# Patient Record
Sex: MALE | Race: BLACK OR AFRICAN AMERICAN | NOT HISPANIC OR LATINO | Employment: UNEMPLOYED | URBAN - METROPOLITAN AREA
[De-identification: names, ages, dates, MRNs, and addresses within clinical notes are randomized per-mention and may not be internally consistent; named-entity substitution may affect disease eponyms.]

---

## 2021-11-17 ENCOUNTER — OFFICE VISIT (OUTPATIENT)
Dept: URGENT CARE | Facility: CLINIC | Age: 31
End: 2021-11-17
Payer: MEDICAID

## 2021-11-17 VITALS
TEMPERATURE: 98 F | HEIGHT: 73 IN | WEIGHT: 110 LBS | SYSTOLIC BLOOD PRESSURE: 111 MMHG | OXYGEN SATURATION: 96 % | BODY MASS INDEX: 14.58 KG/M2 | DIASTOLIC BLOOD PRESSURE: 72 MMHG | HEART RATE: 76 BPM | RESPIRATION RATE: 18 BRPM

## 2021-11-17 DIAGNOSIS — R10.9 ABDOMINAL PAIN, UNSPECIFIED ABDOMINAL LOCATION: ICD-10-CM

## 2021-11-17 DIAGNOSIS — R05.9 COUGH: ICD-10-CM

## 2021-11-17 DIAGNOSIS — R11.2 NAUSEA AND VOMITING, INTRACTABILITY OF VOMITING NOT SPECIFIED, UNSPECIFIED VOMITING TYPE: Primary | ICD-10-CM

## 2021-11-17 LAB
BILIRUB UR QL STRIP: NEGATIVE
CTP QC/QA: YES
GLUCOSE UR QL STRIP: NEGATIVE
KETONES UR QL STRIP: POSITIVE
LEUKOCYTE ESTERASE UR QL STRIP: NEGATIVE
PH, POC UA: 7.5
POC BLOOD, URINE: NEGATIVE
POC NITRATES, URINE: NEGATIVE
PROT UR QL STRIP: POSITIVE
SARS-COV-2 RDRP RESP QL NAA+PROBE: NEGATIVE
SP GR UR STRIP: 1.01 (ref 1–1.03)
UROBILINOGEN UR STRIP-ACNC: ABNORMAL (ref 0.3–2.2)

## 2021-11-17 PROCEDURE — 99203 PR OFFICE/OUTPT VISIT, NEW, LEVL III, 30-44 MIN: ICD-10-PCS | Mod: 25,S$GLB,, | Performed by: NURSE PRACTITIONER

## 2021-11-17 PROCEDURE — U0002 COVID-19 LAB TEST NON-CDC: HCPCS | Mod: QW,S$GLB,, | Performed by: NURSE PRACTITIONER

## 2021-11-17 PROCEDURE — U0002: ICD-10-PCS | Mod: QW,S$GLB,, | Performed by: NURSE PRACTITIONER

## 2021-11-17 PROCEDURE — 99203 OFFICE O/P NEW LOW 30 MIN: CPT | Mod: 25,S$GLB,, | Performed by: NURSE PRACTITIONER

## 2021-11-17 PROCEDURE — 81003 POCT URINALYSIS, DIPSTICK, AUTOMATED, W/O SCOPE: ICD-10-PCS | Mod: QW,S$GLB,, | Performed by: NURSE PRACTITIONER

## 2021-11-17 PROCEDURE — 81003 URINALYSIS AUTO W/O SCOPE: CPT | Mod: QW,S$GLB,, | Performed by: NURSE PRACTITIONER

## 2021-11-17 RX ORDER — LEVOCETIRIZINE DIHYDROCHLORIDE 5 MG/1
5 TABLET, FILM COATED ORAL NIGHTLY
Qty: 30 TABLET | Refills: 11 | Status: SHIPPED | OUTPATIENT
Start: 2021-11-17 | End: 2023-09-07

## 2021-11-17 RX ORDER — ONDANSETRON 2 MG/ML
4 INJECTION INTRAMUSCULAR; INTRAVENOUS
Status: DISCONTINUED | OUTPATIENT
Start: 2021-11-17 | End: 2021-11-17

## 2021-11-17 RX ORDER — ONDANSETRON 2 MG/ML
4 INJECTION INTRAMUSCULAR; INTRAVENOUS
Status: COMPLETED | OUTPATIENT
Start: 2021-11-17 | End: 2021-11-17

## 2021-11-17 RX ORDER — ONDANSETRON 4 MG/1
4 TABLET, ORALLY DISINTEGRATING ORAL EVERY 6 HOURS PRN
Qty: 20 TABLET | Refills: 0 | OUTPATIENT
Start: 2021-11-17 | End: 2022-07-26

## 2021-11-17 RX ORDER — PROMETHAZINE HYDROCHLORIDE AND DEXTROMETHORPHAN HYDROBROMIDE 6.25; 15 MG/5ML; MG/5ML
5 SYRUP ORAL
Qty: 118 ML | Refills: 0 | OUTPATIENT
Start: 2021-11-17 | End: 2022-07-26

## 2021-11-17 RX ADMIN — ONDANSETRON 4 MG: 2 INJECTION INTRAMUSCULAR; INTRAVENOUS at 05:11

## 2021-11-18 ENCOUNTER — HOSPITAL ENCOUNTER (EMERGENCY)
Facility: HOSPITAL | Age: 31
Discharge: HOME | End: 2021-11-18
Attending: EMERGENCY MEDICINE
Payer: MEDICAID

## 2021-11-18 VITALS
BODY MASS INDEX: 15.17 KG/M2 | SYSTOLIC BLOOD PRESSURE: 100 MMHG | OXYGEN SATURATION: 99 % | TEMPERATURE: 97 F | RESPIRATION RATE: 16 BRPM | DIASTOLIC BLOOD PRESSURE: 63 MMHG | HEART RATE: 74 BPM | WEIGHT: 115 LBS

## 2021-11-18 DIAGNOSIS — B34.9 VIRAL SYNDROME: ICD-10-CM

## 2021-11-18 DIAGNOSIS — R11.2 NAUSEA AND VOMITING, INTRACTABILITY OF VOMITING NOT SPECIFIED, UNSPECIFIED VOMITING TYPE: Primary | ICD-10-CM

## 2021-11-18 LAB
ALBUMIN SERPL BCP-MCNC: 3.6 G/DL (ref 3.5–5.2)
ALP SERPL-CCNC: 62 U/L (ref 55–135)
ALT SERPL W/O P-5'-P-CCNC: 14 U/L (ref 10–44)
ANION GAP SERPL CALC-SCNC: 12 MMOL/L (ref 8–16)
AST SERPL-CCNC: 16 U/L (ref 10–40)
BASOPHILS # BLD AUTO: 0.01 K/UL (ref 0–0.2)
BASOPHILS NFR BLD: 0.1 % (ref 0–1.9)
BILIRUB SERPL-MCNC: 0.6 MG/DL (ref 0.1–1)
BUN SERPL-MCNC: 14 MG/DL (ref 6–20)
CALCIUM SERPL-MCNC: 9.4 MG/DL (ref 8.7–10.5)
CHLORIDE SERPL-SCNC: 101 MMOL/L (ref 95–110)
CO2 SERPL-SCNC: 25 MMOL/L (ref 23–29)
CREAT SERPL-MCNC: 0.9 MG/DL (ref 0.5–1.4)
DIFFERENTIAL METHOD: ABNORMAL
EOSINOPHIL # BLD AUTO: 0 K/UL (ref 0–0.5)
EOSINOPHIL NFR BLD: 0.4 % (ref 0–8)
ERYTHROCYTE [DISTWIDTH] IN BLOOD BY AUTOMATED COUNT: 12.3 % (ref 11.5–14.5)
EST. GFR  (AFRICAN AMERICAN): >60 ML/MIN/1.73 M^2
EST. GFR  (NON AFRICAN AMERICAN): >60 ML/MIN/1.73 M^2
GLUCOSE SERPL-MCNC: 105 MG/DL (ref 70–110)
HCT VFR BLD AUTO: 41.7 % (ref 40–54)
HGB BLD-MCNC: 14.3 G/DL (ref 14–18)
IMM GRANULOCYTES # BLD AUTO: 0.02 K/UL (ref 0–0.04)
IMM GRANULOCYTES NFR BLD AUTO: 0.3 % (ref 0–0.5)
LIPASE SERPL-CCNC: 25 U/L (ref 4–60)
LYMPHOCYTES # BLD AUTO: 0.8 K/UL (ref 1–4.8)
LYMPHOCYTES NFR BLD: 11.7 % (ref 18–48)
MCH RBC QN AUTO: 28.6 PG (ref 27–31)
MCHC RBC AUTO-ENTMCNC: 34.3 G/DL (ref 32–36)
MCV RBC AUTO: 83 FL (ref 82–98)
MONOCYTES # BLD AUTO: 0.6 K/UL (ref 0.3–1)
MONOCYTES NFR BLD: 8 % (ref 4–15)
NEUTROPHILS # BLD AUTO: 5.4 K/UL (ref 1.8–7.7)
NEUTROPHILS NFR BLD: 79.5 % (ref 38–73)
NRBC BLD-RTO: 0 /100 WBC
PLATELET # BLD AUTO: 288 K/UL (ref 150–450)
PMV BLD AUTO: 10.8 FL (ref 9.2–12.9)
POTASSIUM SERPL-SCNC: 3.7 MMOL/L (ref 3.5–5.1)
PROT SERPL-MCNC: 7.9 G/DL (ref 6–8.4)
RBC # BLD AUTO: 5 M/UL (ref 4.6–6.2)
SODIUM SERPL-SCNC: 138 MMOL/L (ref 136–145)
WBC # BLD AUTO: 6.85 K/UL (ref 3.9–12.7)

## 2021-11-18 PROCEDURE — 25000003 PHARM REV CODE 250: Performed by: PHYSICIAN ASSISTANT

## 2021-11-18 PROCEDURE — 83690 ASSAY OF LIPASE: CPT | Performed by: PHYSICIAN ASSISTANT

## 2021-11-18 PROCEDURE — 36415 COLL VENOUS BLD VENIPUNCTURE: CPT | Performed by: PHYSICIAN ASSISTANT

## 2021-11-18 PROCEDURE — 63600175 PHARM REV CODE 636 W HCPCS: Performed by: PHYSICIAN ASSISTANT

## 2021-11-18 PROCEDURE — 80053 COMPREHEN METABOLIC PANEL: CPT | Performed by: PHYSICIAN ASSISTANT

## 2021-11-18 PROCEDURE — 96361 HYDRATE IV INFUSION ADD-ON: CPT

## 2021-11-18 PROCEDURE — 96374 THER/PROPH/DIAG INJ IV PUSH: CPT

## 2021-11-18 PROCEDURE — 85025 COMPLETE CBC W/AUTO DIFF WBC: CPT | Performed by: PHYSICIAN ASSISTANT

## 2021-11-18 PROCEDURE — 99284 EMERGENCY DEPT VISIT MOD MDM: CPT | Mod: 25

## 2021-11-18 RX ORDER — ONDANSETRON 2 MG/ML
4 INJECTION INTRAMUSCULAR; INTRAVENOUS
Status: COMPLETED | OUTPATIENT
Start: 2021-11-18 | End: 2021-11-18

## 2021-11-18 RX ADMIN — ONDANSETRON 4 MG: 2 INJECTION INTRAMUSCULAR; INTRAVENOUS at 06:11

## 2021-11-18 RX ADMIN — SODIUM CHLORIDE 1000 ML: 0.9 INJECTION, SOLUTION INTRAVENOUS at 06:11

## 2021-12-22 ENCOUNTER — HOSPITAL ENCOUNTER (EMERGENCY)
Facility: HOSPITAL | Age: 31
Discharge: HOME | End: 2021-12-22
Attending: EMERGENCY MEDICINE
Payer: MEDICAID

## 2021-12-22 ENCOUNTER — PATIENT OUTREACH (OUTPATIENT)
Dept: EMERGENCY MEDICINE | Facility: HOSPITAL | Age: 31
End: 2021-12-22
Payer: MEDICAID

## 2021-12-22 VITALS
WEIGHT: 120 LBS | DIASTOLIC BLOOD PRESSURE: 64 MMHG | SYSTOLIC BLOOD PRESSURE: 110 MMHG | TEMPERATURE: 99 F | HEIGHT: 73 IN | RESPIRATION RATE: 18 BRPM | OXYGEN SATURATION: 100 % | BODY MASS INDEX: 15.9 KG/M2 | HEART RATE: 88 BPM

## 2021-12-22 DIAGNOSIS — M54.42 ACUTE BILATERAL LOW BACK PAIN WITH LEFT-SIDED SCIATICA: Primary | ICD-10-CM

## 2021-12-22 PROCEDURE — 99284 EMERGENCY DEPT VISIT MOD MDM: CPT | Mod: 25

## 2021-12-22 PROCEDURE — 96372 THER/PROPH/DIAG INJ SC/IM: CPT

## 2021-12-22 PROCEDURE — 63600175 PHARM REV CODE 636 W HCPCS: Performed by: EMERGENCY MEDICINE

## 2021-12-22 RX ORDER — KETOROLAC TROMETHAMINE 30 MG/ML
15 INJECTION, SOLUTION INTRAMUSCULAR; INTRAVENOUS
Status: COMPLETED | OUTPATIENT
Start: 2021-12-22 | End: 2021-12-22

## 2021-12-22 RX ADMIN — KETOROLAC TROMETHAMINE 15 MG: 30 INJECTION, SOLUTION INTRAMUSCULAR at 04:12

## 2021-12-22 NOTE — ED NOTES
Patient denies being seen for fall while in [place]. He reports that he did have LOC. He now has left shin pain, but does not remember hitting his leg when he fell down stairs.

## 2021-12-22 NOTE — ED PROVIDER NOTES
Encounter Date: 12/22/2021    SCRIBE #1 NOTE: I, Dominique Mo, am scribing for, and in the presence of, Jose Dwyer MD.       History     Chief Complaint   Patient presents with    Back Pain     Fall on Saturday down stairs     Time seen by provider: 2:41 AM on 12/22/2021    Michael Aruajo is a 31 y.o. male who presents to the ED with back pain and left lower leg pain that began 5 days ago. Pt reports falling down 4 concrete stairs. The patient denies fever, congestion, cough, chest pain, abdominal pain, N/V, or any other symptoms at this time. No PMHx or PSHx.     The history is provided by the patient.     Review of patient's allergies indicates:  No Known Allergies  History reviewed. No pertinent past medical history.  History reviewed. No pertinent surgical history.  History reviewed. No pertinent family history.  Social History     Tobacco Use    Smoking status: Never Smoker    Smokeless tobacco: Never Used   Substance Use Topics    Alcohol use: Not Currently    Drug use: No     Review of Systems   Constitutional: Negative for activity change, diaphoresis and fever.   HENT: Negative for drooling, rhinorrhea, sore throat and trouble swallowing.    Eyes: Negative for pain and visual disturbance.   Respiratory: Negative for cough, shortness of breath and stridor.    Cardiovascular: Negative for chest pain and leg swelling.   Gastrointestinal: Negative for abdominal distention, abdominal pain, constipation and vomiting.   Genitourinary: Negative for dysuria, hematuria and penile discharge.   Musculoskeletal: Positive for back pain. Negative for gait problem.        +left lower leg pain   Skin: Negative for rash.   Neurological: Negative for seizures, facial asymmetry and headaches.   Psychiatric/Behavioral: Negative for hallucinations and suicidal ideas.       Physical Exam     Initial Vitals   BP Pulse Resp Temp SpO2   12/22/21 0017 12/22/21 0403 12/22/21 0017 12/22/21 0017 12/22/21 0017   (!) 100/57 81  16 100.1 °F (37.8 °C) 98 %      MAP       --                Physical Exam    Nursing note and vitals reviewed.  Constitutional: He appears well-developed. No distress.   HENT:   Head: Normocephalic and atraumatic.   Nose: Nose normal.   Eyes: EOM are normal.   Neck: Neck supple. No tracheal deviation present. No JVD present.   Cardiovascular: Normal rate, regular rhythm, normal heart sounds and intact distal pulses. Exam reveals no gallop and no friction rub.    No murmur heard.  Pulmonary/Chest: Breath sounds normal. No respiratory distress. He has no wheezes. He has no rhonchi. He has no rales.   Abdominal: Abdomen is soft. Bowel sounds are normal. He exhibits no distension. There is no abdominal tenderness. There is no rebound and no guarding.   Musculoskeletal:         General: Normal range of motion.      Cervical back: Neck supple. No deformity or tenderness.      Thoracic back: No deformity or tenderness.      Lumbar back: No deformity or tenderness.      Left lower leg: No tenderness.     Neurological: He is alert and oriented to person, place, and time. He has normal strength. No cranial nerve deficit or sensory deficit.   Cranial nerves II-XII grossly intact. Finger-to-nose normal. Tone normal. Sensation intact to light touch. No drift. No disdiadochokinesia. 5/5 BUE and BLE strength. Normal gait. Negative Romberg. Speech and cognition is normal. No focal neurologic deficit.   Skin: Skin is warm and dry. Capillary refill takes less than 2 seconds. No rash noted.   Psychiatric: He has a normal mood and affect.         ED Course   Procedures  Labs Reviewed - No data to display       Imaging Results          X-Ray Lumbar Spine Ap And Lateral (Final result)  Result time 12/22/21 06:55:44    Final result by Edward De Dios MD (12/22/21 06:55:44)                 Impression:      Negative views lumbar spine      Electronically signed by: Edward De Dios MD  Date:    12/22/2021  Time:    06:55              Narrative:    EXAMINATION:  XR LUMBAR SPINE AP AND LATERAL    CLINICAL HISTORY:  back pain;    TECHNIQUE:  AP, lateral and spot images were performed of the lumbar spine.    COMPARISON:  May 15, 2003    FINDINGS:  There is no evidence of fracture subluxation in the lumbar region.  There is no evidence of significant disc space narrowing.                                 Medications   ketorolac injection 15 mg (15 mg Intramuscular Given 12/22/21 0403)     Medical Decision Making:   History:   Old Medical Records: I decided to obtain old medical records.  Clinical Tests:   Radiological Study: Ordered and Reviewed  ED Management:  Given History and Exam the patient appears to be at low risk for Spinal Cord Compression Syndrome, Vertebral Malignancy/Mets, acute Spinal Fracture, Vertebral Osteomyelitis, Epidural Abscess, Infected or Obstructing Kidney Stone.    Their presentation appears most likely to be secondary to non-emergent musculoskeletal etiology vs non-emergent disc herniation.    ED Workup: Defer imaging and labwork for outpatient follow up at this time.    Disposition: Discharge. Strict return precautions discussed with patient with full understanding. Advised patient to follow up promptly with primary care provider            Scribe Attestation:   Scribe #1: I performed the above scribed service and the documentation accurately describes the services I performed. I attest to the accuracy of the note.                Attending Attestation:     Physician Attestation for Scribe:    I, Dr. Jose Dwyer, personally performed the services described in this documentation.   All medical record entries made by the scribe were at my direction and in my presence.   I have reviewed the chart and agree that the record is accurate and complete.   Jose Dwyer MD  6:12 PM 12/22/2021     DISCLAIMER: This note was prepared with [email or web address] Naturally Speaking voice recognition transcription software. Garbled syntax, mangled  pronouns, and other bizarre constructions may be attributed to that software system.      Clinical Impression:   Final diagnoses:  [M54.42] Acute bilateral low back pain with left-sided sciatica (Primary)          ED Disposition Condition    Discharge Stable        ED Prescriptions     None        Follow-up Information     Follow up With Specialties Details Why Contact Info    Sony Mcneill MD Family Medicine Schedule an appointment as soon as possible for a visit   [address] [facility] [address]  [facility] [address]  [phone]      [facility] Emergency Dept Emergency Medicine Go to  As needed, If symptoms worsen [address] [place] [id number] [phone]           Jose Dwyer MD  12/22/21 0957

## 2021-12-22 NOTE — ED NOTES
Patient reporting mid back pain and lower back pain after slipping on stairs and sliding down the stairs.

## 2022-07-05 ENCOUNTER — HOSPITAL ENCOUNTER (EMERGENCY)
Facility: HOSPITAL | Age: 32
Discharge: HOME | End: 2022-07-05
Attending: EMERGENCY MEDICINE
Payer: MEDICAID

## 2022-07-05 VITALS
HEART RATE: 79 BPM | SYSTOLIC BLOOD PRESSURE: 109 MMHG | OXYGEN SATURATION: 98 % | WEIGHT: 112 LBS | DIASTOLIC BLOOD PRESSURE: 62 MMHG | RESPIRATION RATE: 19 BRPM | BODY MASS INDEX: 14.78 KG/M2 | TEMPERATURE: 98 F

## 2022-07-05 DIAGNOSIS — S01.01XA SCALP LACERATION, INITIAL ENCOUNTER: Primary | ICD-10-CM

## 2022-07-05 PROCEDURE — 99283 EMERGENCY DEPT VISIT LOW MDM: CPT | Mod: 25

## 2022-07-05 PROCEDURE — 87389 HIV-1 AG W/HIV-1&-2 AB AG IA: CPT | Performed by: EMERGENCY MEDICINE

## 2022-07-05 PROCEDURE — 36415 COLL VENOUS BLD VENIPUNCTURE: CPT | Performed by: EMERGENCY MEDICINE

## 2022-07-05 PROCEDURE — 86803 HEPATITIS C AB TEST: CPT | Performed by: EMERGENCY MEDICINE

## 2022-07-05 PROCEDURE — 12001 RPR S/N/AX/GEN/TRNK 2.5CM/<: CPT

## 2022-07-05 NOTE — ED PROVIDER NOTES
"Encounter Date: 7/5/2022       History     Chief Complaint   Patient presents with    Head Injury    Fall     Presents with Lt parietal scalp lac s/p fall @ approx 0830 this AM; "slipped off stool" he was standing on (no LOC)     Patient is a 32-year-old male who was standing on a small step stool and fell this morning hitting the left frontal parietal region of his head.  He has a small laceration.  He denies any loss of consciousness.  He denies any assault.  He has no weakness numbness vision loss severe headache.  He is not on any blood thinners.  He has no other complaints at this time.        Review of patient's allergies indicates:  No Known Allergies  No past medical history on file.  No past surgical history on file.  No family history on file.  Social History     Tobacco Use    Smoking status: Never Smoker    Smokeless tobacco: Never Used   Substance Use Topics    Alcohol use: Not Currently    Drug use: No     Review of Systems   Constitutional: Negative for fever.   HENT: Negative for congestion and nosebleeds.    Eyes: Negative for pain and visual disturbance.   Respiratory: Negative for shortness of breath.    Cardiovascular: Negative for chest pain.   Gastrointestinal: Negative for abdominal pain, diarrhea, nausea and vomiting.   Neurological: Negative for dizziness, seizures, syncope, speech difficulty, weakness, light-headedness, numbness and headaches.   Hematological: Does not bruise/bleed easily.   Psychiatric/Behavioral: Negative for agitation and confusion.       Physical Exam     Initial Vitals [07/05/22 1124]   BP Pulse Resp Temp SpO2   109/62 79 19 97.9 °F (36.6 °C) 98 %      MAP       --         Physical Exam    Nursing note and vitals reviewed.  Constitutional: He appears well-developed and well-nourished.   HENT:   Head: Normocephalic and atraumatic.   Right Ear: External ear normal.   Left Ear: External ear normal.   Mouth/Throat: Oropharynx is clear and moist.   Small 1 cm " laceration to the left temporal region near the hairline.  It is superficial but slightly deep to the subcutaneous tissue.   Eyes: Conjunctivae and EOM are normal. Pupils are equal, round, and reactive to light.   Neck: Neck supple.   Normal range of motion.  Cardiovascular: Normal rate, regular rhythm, normal heart sounds and intact distal pulses.   Pulmonary/Chest: Breath sounds normal.   Abdominal: Abdomen is soft. Bowel sounds are normal. There is no abdominal tenderness.   Musculoskeletal:         General: Normal range of motion.      Cervical back: Normal range of motion and neck supple.     Neurological: He is alert and oriented to person, place, and time. He has normal strength. No sensory deficit.   Skin: Skin is warm and dry.   Psychiatric: He has a normal mood and affect.         ED Course   Lac Repair    Date/Time: 7/5/2022 11:49 AM  Performed by: Robb Chicas MD  Authorized by: Robb Chicas MD     Consent:     Consent obtained:  Verbal    Consent given by:  Patient    Risks discussed:  Infection, pain, poor cosmetic result and need for additional repair    Alternatives discussed:  No treatment  Universal protocol:     Patient identity confirmed:  Verbally with patient  Anesthesia:     Anesthesia method:  None  Laceration details:     Location:  Scalp    Length (cm):  1  Exploration:     Hemostasis achieved with:  Direct pressure    Wound exploration: wound explored through full range of motion and entire depth of wound visualized      Wound extent: areolar tissue violated      Wound extent: no fascia violation noted      Contaminated: no    Treatment:     Area cleansed with:  Povidone-iodine    Amount of cleaning:  Standard    Irrigation solution: Soapy water.    Debridement:  None  Skin repair:     Repair method:  Tissue adhesive  Approximation:     Approximation:  Close  Repair type:     Repair type:  Simple  Post-procedure details:     Procedure completion:  Tolerated      Labs Reviewed   HIV  1 / 2 ANTIBODY   HEPATITIS C ANTIBODY          Imaging Results    None          Medications - No data to display  Medical Decision Making:   Laceration repaired without complication.  I do not think the patient needs a CT the head at this time.  No signs of ocular injury.  Neurologically intact.  Vital signs are stable.  Stable for discharge at this time with return precautions                      Clinical Impression:   Final diagnoses:  [S01.01XA] Scalp laceration, initial encounter (Primary)          ED Disposition Condition    Discharge Stable        ED Prescriptions     None        Follow-up Information     Follow up With Specialties Details Why Contact Info    Sony Mcneill MD Family Medicine  For wound re-check, As needed [address] [facility] [address]  [facility] [address]  [phone]      [facility] Emergency Dept Emergency Medicine  If symptoms worsen [address] [place] [address] [phone]           Robb Chicas MD  07/05/22 7688

## 2022-07-05 NOTE — ED NOTES
Pt identifiers checked and accurate with Michael Araujo    Pt presents to ED after fall off of stool this AM, hit head on table, laceration to left forehead. Pt denies LOC, dizziness, N/V.

## 2022-07-06 LAB
HCV AB SERPL QL IA: NEGATIVE
HIV 1+2 AB+HIV1 P24 AG SERPL QL IA: NEGATIVE

## 2022-07-10 ENCOUNTER — HOSPITAL ENCOUNTER (EMERGENCY)
Facility: HOSPITAL | Age: 32
Discharge: HOME | End: 2022-07-10
Attending: EMERGENCY MEDICINE
Payer: MEDICAID

## 2022-07-10 VITALS
TEMPERATURE: 98 F | HEART RATE: 73 BPM | BODY MASS INDEX: 14.84 KG/M2 | WEIGHT: 112 LBS | SYSTOLIC BLOOD PRESSURE: 126 MMHG | OXYGEN SATURATION: 98 % | DIASTOLIC BLOOD PRESSURE: 68 MMHG | RESPIRATION RATE: 17 BRPM | HEIGHT: 73 IN

## 2022-07-10 DIAGNOSIS — S61.419A LACERATION OF HAND WITHOUT FOREIGN BODY, UNSPECIFIED LATERALITY, INITIAL ENCOUNTER: ICD-10-CM
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
## 2022-07-10 PROCEDURE — 63600175 PHARM REV CODE 636 W HCPCS: Performed by: EMERGENCY MEDICINE

## 2022-07-10 PROCEDURE — 25000003 PHARM REV CODE 250: Performed by: PHYSICIAN ASSISTANT

## 2022-07-10 PROCEDURE — 25000003 PHARM REV CODE 250: Performed by: EMERGENCY MEDICINE

## 2022-07-10 PROCEDURE — 99284 EMERGENCY DEPT VISIT MOD MDM: CPT | Mod: 25

## 2022-07-10 PROCEDURE — 90471 IMMUNIZATION ADMIN: CPT | Performed by: EMERGENCY MEDICINE

## 2022-07-10 PROCEDURE — 96372 THER/PROPH/DIAG INJ SC/IM: CPT | Performed by: EMERGENCY MEDICINE

## 2022-07-10 PROCEDURE — 90715 TDAP VACCINE 7 YRS/> IM: CPT | Performed by: EMERGENCY MEDICINE

## 2022-07-10 PROCEDURE — 12032 INTMD RPR S/A/T/EXT 2.6-7.5: CPT

## 2022-07-10 RX ORDER — DICLOFENAC SODIUM 75 MG/1
75 TABLET, DELAYED RELEASE ORAL 2 TIMES DAILY
Qty: 60 TABLET | Refills: 0 | Status: SHIPPED | OUTPATIENT
Start: 2022-07-10 | End: 2023-09-07

## 2022-07-10 RX ORDER — KETOROLAC TROMETHAMINE 30 MG/ML
15 INJECTION, SOLUTION INTRAMUSCULAR; INTRAVENOUS
Status: COMPLETED | OUTPATIENT
Start: 2022-07-10 | End: 2022-07-10

## 2022-07-10 RX ORDER — CEPHALEXIN 500 MG/1
500 CAPSULE ORAL 4 TIMES DAILY
Qty: 20 CAPSULE | Refills: 0 | Status: SHIPPED | OUTPATIENT
Start: 2022-07-10 | End: 2022-07-15

## 2022-07-10 RX ORDER — LIDOCAINE HYDROCHLORIDE 10 MG/ML
10 INJECTION INFILTRATION; PERINEURAL
Status: COMPLETED | OUTPATIENT
Start: 2022-07-10 | End: 2022-07-10

## 2022-07-10 RX ORDER — CEPHALEXIN 250 MG/1
500 CAPSULE ORAL
Status: COMPLETED | OUTPATIENT
Start: 2022-07-10 | End: 2022-07-10

## 2022-07-10 RX ADMIN — TETANUS TOXOID, REDUCED DIPHTHERIA TOXOID AND ACELLULAR PERTUSSIS VACCINE, ADSORBED 0.5 ML: 5; 2.5; 8; 8; 2.5 SUSPENSION INTRAMUSCULAR at 08:07

## 2022-07-10 RX ADMIN — KETOROLAC TROMETHAMINE 15 MG: 30 INJECTION, SOLUTION INTRAMUSCULAR at 07:07

## 2022-07-10 RX ADMIN — CEPHALEXIN 500 MG: 250 CAPSULE ORAL at 07:07

## 2022-07-10 RX ADMIN — LIDOCAINE HYDROCHLORIDE 10 ML: 10 INJECTION, SOLUTION INFILTRATION; PERINEURAL at 08:07
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
## 2022-07-11 NOTE — PROCEDURES - EMERGENCY DEPT.
ED Procedure Notes:   Lac Repair    Date/Time: 7/10/2022 8:47 PM  Performed by: Yashira Duran PA-C  Authorized by: Kwame Mckeon MD     Consent:     Consent obtained:  Verbal    Consent given by:  Patient    Risks discussed:  Infection, need for additional repair, pain and poor cosmetic result  Universal protocol:     Patient identity confirmed:  Verbally with patient and arm band  Anesthesia:     Anesthesia method:  Local infiltration    Local anesthetic:  Lidocaine 1% w/o epi  Laceration details:     Location:  Hand (multiple lacerations to the dorsal of the head with the most extensive to the web space of the 4th and 5th digits)    Hand location:  R hand, dorsum  Pre-procedure details:     Preparation:  Patient was prepped and draped in usual sterile fashion  Exploration:     Limited defect created (wound extended): yes      Wound exploration: wound explored through full range of motion    Treatment:     Area cleansed with:  Saline and povidone-iodine    Amount of cleaning:  Extensive    Irrigation solution:  Sterile water    Irrigation method:  Syringe    Visualized foreign bodies/material removed: no      Debridement:  None    Undermining:  None  Skin repair:     Repair method:  Sutures    Suture size:  5-0    Suture material:  Nylon    Suture technique:  Simple interrupted    Number of sutures:  14  Approximation:     Approximation:  Close  Repair type:     Repair type:  Intermediate  Post-procedure details:     Dressing:  Non-adherent dressing and antibiotic ointment    Procedure completion:  Tolerated well, no immediate complications
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
## 2022-07-26 ENCOUNTER — HOSPITAL ENCOUNTER (EMERGENCY)
Facility: HOSPITAL | Age: 32
Discharge: HOME | End: 2022-07-26
Attending: EMERGENCY MEDICINE
Payer: MEDICAID

## 2022-07-26 VITALS
SYSTOLIC BLOOD PRESSURE: 108 MMHG | OXYGEN SATURATION: 97 % | RESPIRATION RATE: 18 BRPM | HEART RATE: 78 BPM | DIASTOLIC BLOOD PRESSURE: 60 MMHG | TEMPERATURE: 99 F | HEIGHT: 73 IN | BODY MASS INDEX: 14.84 KG/M2 | WEIGHT: 112 LBS

## 2022-07-26 DIAGNOSIS — Z48.02 VISIT FOR SUTURE REMOVAL: Primary | ICD-10-CM

## 2022-07-26 PROCEDURE — 99281 EMR DPT VST MAYX REQ PHY/QHP: CPT
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
## 2022-07-29 ENCOUNTER — PATIENT OUTREACH (OUTPATIENT)
Dept: EMERGENCY MEDICINE | Facility: HOSPITAL | Age: 32
End: 2022-07-29
Payer: MEDICAID

## 2023-09-07 ENCOUNTER — HOSPITAL ENCOUNTER (EMERGENCY)
Facility: HOSPITAL | Age: 33
Discharge: ANOTHER HOSPITAL | End: 2023-09-07
Attending: STUDENT IN AN ORGANIZED HEALTH CARE EDUCATION/TRAINING PROGRAM
Payer: MEDICAID

## 2023-09-07 VITALS
WEIGHT: 120 LBS | OXYGEN SATURATION: 98 % | HEIGHT: 73 IN | TEMPERATURE: 98 F | DIASTOLIC BLOOD PRESSURE: 68 MMHG | HEART RATE: 96 BPM | SYSTOLIC BLOOD PRESSURE: 110 MMHG | RESPIRATION RATE: 16 BRPM | BODY MASS INDEX: 15.9 KG/M2

## 2023-09-07 DIAGNOSIS — R09.02 HYPOXEMIA: ICD-10-CM

## 2023-09-07 DIAGNOSIS — J45.901 SEVERE ASTHMA WITH EXACERBATION, UNSPECIFIED WHETHER PERSISTENT: Primary | ICD-10-CM

## 2023-09-07 PROBLEM — J96.01 ACUTE RESPIRATORY FAILURE WITH HYPOXIA: Status: ACTIVE | Noted: 2023-09-07

## 2023-09-07 PROBLEM — E43 SEVERE MALNUTRITION: Status: ACTIVE | Noted: 2023-09-07

## 2023-09-07 LAB
ALBUMIN SERPL BCP-MCNC: 4.1 G/DL (ref 3.5–5.2)
ALLENS TEST: ABNORMAL
ALP SERPL-CCNC: 59 U/L (ref 55–135)
ALT SERPL W/O P-5'-P-CCNC: 11 U/L (ref 10–44)
ANION GAP SERPL CALC-SCNC: 10 MMOL/L (ref 8–16)
AST SERPL-CCNC: 17 U/L (ref 10–40)
BASOPHILS # BLD AUTO: 0.03 K/UL (ref 0–0.2)
BASOPHILS NFR BLD: 0.6 % (ref 0–1.9)
BILIRUB SERPL-MCNC: 0.6 MG/DL (ref 0.1–1)
BNP SERPL-MCNC: <10 PG/ML (ref 0–99)
BUN SERPL-MCNC: 7 MG/DL (ref 6–20)
CALCIUM SERPL-MCNC: 8.8 MG/DL (ref 8.7–10.5)
CHLORIDE SERPL-SCNC: 107 MMOL/L (ref 95–110)
CO2 SERPL-SCNC: 24 MMOL/L (ref 23–29)
CREAT SERPL-MCNC: 0.9 MG/DL (ref 0.5–1.4)
DELSYS: ABNORMAL
DIFFERENTIAL METHOD: NORMAL
EOSINOPHIL # BLD AUTO: 0.3 K/UL (ref 0–0.5)
EOSINOPHIL NFR BLD: 6.6 % (ref 0–8)
ERYTHROCYTE [DISTWIDTH] IN BLOOD BY AUTOMATED COUNT: 13.3 % (ref 11.5–14.5)
EST. GFR  (NO RACE VARIABLE): >60 ML/MIN/1.73 M^2
FLOW: 7
GLUCOSE SERPL-MCNC: 98 MG/DL (ref 70–110)
HCO3 UR-SCNC: 20.3 MMOL/L (ref 24–28)
HCT VFR BLD AUTO: 47.6 % (ref 40–54)
HGB BLD-MCNC: 15.7 G/DL (ref 14–18)
IMM GRANULOCYTES # BLD AUTO: 0 K/UL (ref 0–0.04)
IMM GRANULOCYTES NFR BLD AUTO: 0 % (ref 0–0.5)
LYMPHOCYTES # BLD AUTO: 1.3 K/UL (ref 1–4.8)
LYMPHOCYTES NFR BLD: 27.9 % (ref 18–48)
MAGNESIUM SERPL-MCNC: 1.9 MG/DL (ref 1.6–2.6)
MCH RBC QN AUTO: 29 PG (ref 27–31)
MCHC RBC AUTO-ENTMCNC: 33 G/DL (ref 32–36)
MCV RBC AUTO: 88 FL (ref 82–98)
MODE: ABNORMAL
MONOCYTES # BLD AUTO: 0.6 K/UL (ref 0.3–1)
MONOCYTES NFR BLD: 13.2 % (ref 4–15)
NEUTROPHILS # BLD AUTO: 2.4 K/UL (ref 1.8–7.7)
NEUTROPHILS NFR BLD: 51.7 % (ref 38–73)
NRBC BLD-RTO: 0 /100 WBC
PCO2 BLDA: 36.8 MMHG (ref 35–45)
PH SMN: 7.35 [PH] (ref 7.35–7.45)
PLATELET # BLD AUTO: 241 K/UL (ref 150–450)
PMV BLD AUTO: 10.6 FL (ref 9.2–12.9)
PO2 BLDA: 91 MMHG (ref 80–100)
POC BE: -5 MMOL/L
POC SATURATED O2: 97 % (ref 95–100)
POC TCO2: 21 MMOL/L (ref 23–27)
POTASSIUM SERPL-SCNC: 3.9 MMOL/L (ref 3.5–5.1)
PROT SERPL-MCNC: 7.8 G/DL (ref 6–8.4)
RBC # BLD AUTO: 5.42 M/UL (ref 4.6–6.2)
SAMPLE: ABNORMAL
SARS-COV-2 RDRP RESP QL NAA+PROBE: NEGATIVE
SITE: ABNORMAL
SODIUM SERPL-SCNC: 141 MMOL/L (ref 136–145)
SP02: 100
TROPONIN I SERPL DL<=0.01 NG/ML-MCNC: <0.006 NG/ML (ref 0–0.03)
WBC # BLD AUTO: 4.7 K/UL (ref 3.9–12.7)

## 2023-09-07 PROCEDURE — 83735 ASSAY OF MAGNESIUM: CPT | Performed by: STUDENT IN AN ORGANIZED HEALTH CARE EDUCATION/TRAINING PROGRAM

## 2023-09-07 PROCEDURE — 93005 ELECTROCARDIOGRAM TRACING: CPT

## 2023-09-07 PROCEDURE — 94640 AIRWAY INHALATION TREATMENT: CPT | Mod: XB

## 2023-09-07 PROCEDURE — 85025 COMPLETE CBC W/AUTO DIFF WBC: CPT | Performed by: STUDENT IN AN ORGANIZED HEALTH CARE EDUCATION/TRAINING PROGRAM

## 2023-09-07 PROCEDURE — 94660 CPAP INITIATION&MGMT: CPT

## 2023-09-07 PROCEDURE — 82803 BLOOD GASES ANY COMBINATION: CPT

## 2023-09-07 PROCEDURE — 25000003 PHARM REV CODE 250: Performed by: STUDENT IN AN ORGANIZED HEALTH CARE EDUCATION/TRAINING PROGRAM

## 2023-09-07 PROCEDURE — 96365 THER/PROPH/DIAG IV INF INIT: CPT

## 2023-09-07 PROCEDURE — 63600175 PHARM REV CODE 636 W HCPCS: Performed by: STUDENT IN AN ORGANIZED HEALTH CARE EDUCATION/TRAINING PROGRAM

## 2023-09-07 PROCEDURE — U0002 COVID-19 LAB TEST NON-CDC: HCPCS | Performed by: STUDENT IN AN ORGANIZED HEALTH CARE EDUCATION/TRAINING PROGRAM

## 2023-09-07 PROCEDURE — 94799 UNLISTED PULMONARY SVC/PX: CPT

## 2023-09-07 PROCEDURE — 36415 COLL VENOUS BLD VENIPUNCTURE: CPT | Performed by: STUDENT IN AN ORGANIZED HEALTH CARE EDUCATION/TRAINING PROGRAM

## 2023-09-07 PROCEDURE — 94644 CONT INHLJ TX 1ST HOUR: CPT

## 2023-09-07 PROCEDURE — 99291 CRITICAL CARE FIRST HOUR: CPT

## 2023-09-07 PROCEDURE — 93010 ELECTROCARDIOGRAM REPORT: CPT | Mod: ,,, | Performed by: GENERAL PRACTICE

## 2023-09-07 PROCEDURE — 99900035 HC TECH TIME PER 15 MIN (STAT)

## 2023-09-07 PROCEDURE — 27000221 HC OXYGEN, UP TO 24 HOURS

## 2023-09-07 PROCEDURE — 80053 COMPREHEN METABOLIC PANEL: CPT | Performed by: STUDENT IN AN ORGANIZED HEALTH CARE EDUCATION/TRAINING PROGRAM

## 2023-09-07 PROCEDURE — 94645 CONT INHLJ TX EACH ADDL HOUR: CPT

## 2023-09-07 PROCEDURE — 96375 TX/PRO/DX INJ NEW DRUG ADDON: CPT

## 2023-09-07 PROCEDURE — 36600 WITHDRAWAL OF ARTERIAL BLOOD: CPT

## 2023-09-07 PROCEDURE — 25000242 PHARM REV CODE 250 ALT 637 W/ HCPCS: Performed by: STUDENT IN AN ORGANIZED HEALTH CARE EDUCATION/TRAINING PROGRAM

## 2023-09-07 PROCEDURE — 83880 ASSAY OF NATRIURETIC PEPTIDE: CPT | Performed by: STUDENT IN AN ORGANIZED HEALTH CARE EDUCATION/TRAINING PROGRAM

## 2023-09-07 PROCEDURE — 93010 EKG 12-LEAD: ICD-10-PCS | Mod: ,,, | Performed by: GENERAL PRACTICE

## 2023-09-07 PROCEDURE — 84484 ASSAY OF TROPONIN QUANT: CPT | Performed by: STUDENT IN AN ORGANIZED HEALTH CARE EDUCATION/TRAINING PROGRAM

## 2023-09-07 RX ORDER — ALBUTEROL SULFATE 2.5 MG/.5ML
2.5 SOLUTION RESPIRATORY (INHALATION)
Status: COMPLETED | OUTPATIENT
Start: 2023-09-07 | End: 2023-09-07

## 2023-09-07 RX ORDER — METHYLPREDNISOLONE SOD SUCC 125 MG
125 VIAL (EA) INJECTION
Status: COMPLETED | OUTPATIENT
Start: 2023-09-07 | End: 2023-09-07

## 2023-09-07 RX ORDER — ALBUTEROL SULFATE 90 UG/1
2 AEROSOL, METERED RESPIRATORY (INHALATION) EVERY 6 HOURS PRN
Status: ON HOLD | COMMUNITY
End: 2023-09-08 | Stop reason: SDUPTHER

## 2023-09-07 RX ORDER — ALBUTEROL SULFATE 2.5 MG/.5ML
2.5 SOLUTION RESPIRATORY (INHALATION) CONTINUOUS
Status: DISCONTINUED | OUTPATIENT
Start: 2023-09-07 | End: 2023-09-07 | Stop reason: HOSPADM

## 2023-09-07 RX ORDER — IPRATROPIUM BROMIDE AND ALBUTEROL SULFATE 2.5; .5 MG/3ML; MG/3ML
3 SOLUTION RESPIRATORY (INHALATION) CONTINUOUS
Status: DISCONTINUED | OUTPATIENT
Start: 2023-09-07 | End: 2023-09-07 | Stop reason: HOSPADM

## 2023-09-07 RX ORDER — MAGNESIUM SULFATE HEPTAHYDRATE 40 MG/ML
2 INJECTION, SOLUTION INTRAVENOUS
Status: COMPLETED | OUTPATIENT
Start: 2023-09-07 | End: 2023-09-07

## 2023-09-07 RX ADMIN — IPRATROPIUM BROMIDE AND ALBUTEROL SULFATE 3 ML: .5; 3 SOLUTION RESPIRATORY (INHALATION) at 10:09

## 2023-09-07 RX ADMIN — METHYLPREDNISOLONE SODIUM SUCCINATE 125 MG: 125 INJECTION, POWDER, FOR SOLUTION INTRAMUSCULAR; INTRAVENOUS at 10:09

## 2023-09-07 RX ADMIN — ALBUTEROL SULFATE 2.5 MG: 2.5 SOLUTION RESPIRATORY (INHALATION) at 10:09

## 2023-09-07 RX ADMIN — ALBUTEROL SULFATE 2.5 MG: 2.5 SOLUTION RESPIRATORY (INHALATION) at 11:09

## 2023-09-07 RX ADMIN — ALBUTEROL SULFATE 2.5 MG: 2.5 SOLUTION RESPIRATORY (INHALATION) at 05:09

## 2023-09-07 RX ADMIN — MAGNESIUM SULFATE 2 G: 2 INJECTION INTRAVENOUS at 10:09

## 2023-09-07 RX ADMIN — SODIUM CHLORIDE 1000 ML: 9 INJECTION, SOLUTION INTRAVENOUS at 10:09

## 2023-09-07 NOTE — PHARMACY MED REC
"Admission Medication History     The home medication history was taken by Kelvin Fontenot.    You may go to "Admission" then "Reconcile Home Medications" tabs to review and/or act upon these items.     The home medication list has been updated by the Pharmacy department.   Please read ALL comments highlighted in yellow.   Please address this information as you see fit.    Feel free to contact us if you have any questions or require assistance.      The medications listed below were removed from the home medication list. Please reorder if appropriate:  Patient reports no longer taking the following medication(s):  Cyproheptadine 4 mg  Diclofenac 75 mg  Levocetirizine 5 mg    Medications listed below were obtained from: Patient/family and Analytic software- [employer]  No current facility-administered medications on file prior to encounter.     Current Outpatient Medications on File Prior to Encounter   Medication Sig Dispense Refill    albuterol (PROVENTIL/VENTOLIN HFA) 90 mcg/actuation inhaler Inhale 2 puffs into the lungs every 6 (six) hours as needed for Wheezing. Rescue      [DISCONTINUED] cyproheptadine (PERIACTIN) 4 mg tablet Take 1 tablet (4 mg total) by mouth 4 (four) times daily. 120 tablet 11    [DISCONTINUED] diclofenac (VOLTAREN) 75 MG EC tablet Take 1 tablet (75 mg total) by mouth 2 (two) times daily. 60 tablet 0    [DISCONTINUED] levocetirizine (XYZAL) 5 MG tablet Take 1 tablet (5 mg total) by mouth every evening. 30 tablet 11       Potential issues to be addressed PRIOR TO DISCHARGE      Kelvin Fontenot  EXT 1924                 .          "

## 2023-09-07 NOTE — PLAN OF CARE
Outside Transfer Acceptance Note / [facility]erral [facility]    Referring facility: [facility]   Referring provider: TONI RUIZ JR.  Accepting facility: [facility]  Accepting provider: REGINE ANDERSON  Admitting provider: As above  Reason for transfer:  Needs admission for asthma exacerbation  Transfer diagnosis: Asthma exacerbation  Transfer specialty requested: Hospital Medicine  Transfer specialty notified: Yes  Transfer level: NUMBER 1-5: 2  Bed type requested: ICU  Isolation status: No active isolations   Admission class or status: OP- Observation      Narrative     33M currently at [facility] for evaluation of severe asthma exacerbation. Referring provider unable to fully determine initial onset during initial evaluation as patient can barely speak, though confirms later that it began this AM without clear trigger.  Moderate to severe severity however he does report he has been in the ICU for similar symptoms and previous asthma exacerbation. Upon arrival, exam notable for oxygen in the upper 80s, tachypneic, decreased breath sounds bilaterally, abdominal and suprasternal accessory muscle usage. Labs and imaging are unremarkable with ABG 7.348/36.8/91/20.3/97%. Patient has received solumedrol 125 mg, IVF, and albuterol nebulization, as well as magnesium. Patient targeted to [facility] ICU for further evaluation.    Objective     Vitals: Temp: 97.6 °F (36.4 °C) (09/07/23 0947)  Pulse: 93 (09/07/23 1407)  Resp: 20 (09/07/23 1114)  BP: 121/79 (09/07/23 1332)  SpO2: 98 % (09/07/23 1407)  Recent Labs: All pertinent labs within the past 24 hours have been reviewed.  Recent imaging: See above  Airway:     Vent settings: Oxygen Concentration (%):  [30] 30       IV access:        Peripheral IV - Single Lumen 09/07/23 1000 20 G Anterior;Distal;Left Upper Arm (Active)     Infusions: None  Allergies: Review of patient's allergies indicates:  No  Known Allergies   NPO: Yes    Anticoagulation:   Anticoagulants       None             Instructions      Upon patient arrival to the ICU, please contact Critical Care Medicine on call.

## 2023-09-07 NOTE — ED PROVIDER NOTES
Encounter Date: 9/7/2023       History     Chief Complaint   Patient presents with    Shortness of Breath     Patient states he has asthma and is having an episode of tightness in his lungs and feels as if he cannot take a deep breath      33-year-old male presents with severe asthma exacerbation.  Unable to fully determine initial onset during initial evaluation as patient can barely speak.  Moderate to severe severity however he does report he has been in the ICU for similar symptoms and previous asthma exacerbation.    The history is provided by the patient.     Review of patient's allergies indicates:  No Known Allergies  No past medical history on file.  No past surgical history on file.  No family history on file.  Social History     Tobacco Use    Smoking status: Never    Smokeless tobacco: Never   Substance Use Topics    Alcohol use: Not Currently    Drug use: No     Review of Systems   All other systems reviewed and are negative.      Physical Exam     Initial Vitals [09/07/23 0947]   BP Pulse Resp Temp SpO2   118/76 100 (!) 24 97.6 °F (36.4 °C) (!) 93 %      MAP       --         Physical Exam    Nursing note and vitals reviewed.  Constitutional: Vital signs are normal.  Non-toxic appearance. He appears distressed.   HENT:   Head: Normocephalic and atraumatic.   Eyes: EOM are normal. Right eye exhibits no discharge. Left eye exhibits no discharge.   Neck:   Normal range of motion.  Cardiovascular:  Normal rate and regular rhythm.           Pulmonary/Chest: No stridor. He is in respiratory distress.   Oxygen in the upper 80s, tachypneic, decreased breath sounds bilaterally, abdominal and suprasternal accessory muscle usage   Abdominal: Abdomen is soft. There is no abdominal tenderness.   Musculoskeletal:         General: No tenderness or edema.      Cervical back: Normal range of motion. No rigidity.     Neurological: He is alert.   No associated focal motor or sensory deficit   Skin: Skin is warm and dry.  No rash noted.   Psychiatric: His speech is normal. He is not actively hallucinating.   Not anxious  or agitated         ED Course   Procedures  Labs Reviewed   ISTAT PROCEDURE - Abnormal; Notable for the following components:       Result Value    POC PH 7.348 (*)     POC HCO3 20.3 (*)     POC TCO2 21 (*)     All other components within normal limits   CBC W/ AUTO DIFFERENTIAL   COMPREHENSIVE METABOLIC PANEL   TROPONIN I   B-TYPE NATRIURETIC PEPTIDE   MAGNESIUM   SARS-COV-2 RNA AMPLIFICATION, QUAL     EKG Readings: (Independently Interpreted)   EKG interpreted by myself Shad Li DO  Rate 105, sinus tachycardia, , , no STEMI       Imaging Results              X-Ray Chest AP Portable (Final result)  Result time 09/07/23 10:36:56      Final result by Zara Skinner MD (09/07/23 10:36:56)                   Impression:      No acute cardiopulmonary abnormality.      Electronically signed by: Zara Skinner  Date:    09/07/2023  Time:    10:36               Narrative:    EXAMINATION:  XR CHEST AP PORTABLE    CLINICAL HISTORY:  Chest Pain;    TECHNIQUE:  Single view of the chest was obtained.    COMPARISON:  None    FINDINGS:  Normal cardiomediastinal contour. No focal consolidation, pleural effusion or pneumothorax.                        Final result by Zara Skinner MD (09/07/23 10:36:56)                   Impression:      No acute cardiopulmonary abnormality.      Electronically signed by: Zara Skinner  Date:    09/07/2023  Time:    10:36               Narrative:    EXAMINATION:  XR CHEST AP PORTABLE    CLINICAL HISTORY:  Chest Pain;    TECHNIQUE:  Single view of the chest was obtained.    COMPARISON:  None    FINDINGS:  Normal cardiomediastinal contour. No focal consolidation, pleural effusion or pneumothorax.                                       Medications   albuterol-ipratropium 2.5 mg-0.5 mg/3 mL nebulizer solution 3 mL (3 mLs Nebulization New Bag 9/7/23 1007)   albuterol sulfate nebulizer  solution 2.5 mg (2.5 mg Nebulization New Bag 9/7/23 1112)   albuterol sulfate nebulizer solution 2.5 mg (2.5 mg Nebulization Given 9/7/23 1001)   sodium chloride 0.9% bolus 1,000 mL 1,000 mL (0 mLs Intravenous Stopped 9/7/23 1122)   magnesium sulfate 2g in water 50mL IVPB (premix) (0 g Intravenous Stopped 9/7/23 1148)   methylPREDNISolone sodium succinate injection 125 mg (125 mg Intravenous Given 9/7/23 1022)   albuterol sulfate nebulizer solution 2.5 mg (2.5 mg Nebulization Given 9/7/23 1745)     Medical Decision Making  33-year-old male presents with asthma exacerbation.  Patient hypoxemic on arrival can only speak in phrases, patient administered 1 L IV fluids, IV magnesium, continuous albuterol and placed on noninvasive breathing support.  Workup with no acute metabolic or electrolyte pathology, ABG with CO2 36.  No beds available at [facility] or [facility].  PFC placed for transfer in bed available at [facility].  Patient agreeable to going to [facility].  Spoke with physician Dr. Hyman will accept patient in transfer.  Patient updated and agrees with plans    Amount and/or Complexity of Data Reviewed  Labs: ordered. Decision-making details documented in ED Course.  Radiology: ordered and independent interpretation performed.     Details: No large pneumothorax per my interpretation  ECG/medicine tests: ordered and independent interpretation performed.     Details: No STEMI  Discussion of management or test interpretation with external provider(s): Dr. Hyman accepting physician    Risk  Prescription drug management.  Decision regarding hospitalization.    Critical Care  Total time providing critical care: 45 minutes               ED Course as of 09/07/23 1746   Thu Sep 07, 2023   1000 Patient presents with severe asthma exacerbation and hypoxemia.  History of ICU admission per history.  Patient placed on oxygen support due to hypoxemia 5 L  nasal cannula, magnesium, steroids and continuous breathing treatment ordered. [KB]   1111 SARS-CoV-2 RNA, Amplification, Qual: Negative [KB]   1111 Magnesium : 1.9 [KB]   1111 BNP: <10 [KB]   1111 Troponin I: <0.006 [KB]   1111 Sodium: 141 [KB]   1111 Potassium: 3.9 [KB]   1111 Chloride: 107 [KB]   1111 CO2: 24 [KB]   1111 Glucose: 98 [KB]   1111 Creatinine: 0.9 [KB]   1111 WBC: 4.70 [KB]   1111 Hemoglobin: 15.7 [KB]   1111 Hematocrit: 47.6 [KB]   1115 Patient re-evaluated offered continuous albuterol still with persistent wheezing and accessory muscle usage.  We will continue continuous albuterol treatment and transfer for higher level of care [KB]   1255 Accepting  physician Dr. Hyman- [facility] [KB]   1255 POC PH(!): 7.348 [KB]   1255 POC PCO2: 36.8 [KB]   1255 POC PO2: 91 [KB]   1255 POC HCO3(!): 20.3 [KB]   1255 Patient reassessed, improving with noninvasive breathing support [KB]   1745 Patient re-evaluated doing well we will administer additional breathing treatment, on 5 L face mask at this time [KB]      ED Course User Index  [KB] Shad Li Jr.,                     Clinical Impression:   Final diagnoses:  [R09.02] Hypoxemia  [J45.901] Severe asthma with exacerbation, unspecified whether persistent (Primary)        ED Disposition Condition    Transfer to Another Facility Stable                Shad Li Jr.,   09/07/23 1258       Shad Li Jr.,   09/07/23 4796

## 2023-09-08 PROBLEM — J96.01 ACUTE RESPIRATORY FAILURE WITH HYPOXIA: Status: RESOLVED | Noted: 2023-09-07 | Resolved: 2023-09-08

## 2023-09-11 ENCOUNTER — TELEPHONE (OUTPATIENT)
Dept: INTERNAL MEDICINE | Facility: CLINIC | Age: 33
End: 2023-09-11
Payer: MEDICAID

## 2023-09-11 NOTE — TELEPHONE ENCOUNTER
----- Message from Bong Higgins sent at 9/11/2023  2:37 PM CDT -----  .Type: Patient Call Back    Who called: MESSI LEON [[id number]]    What is the request in detail: pt would like an referral.Please contact to further discuss and advise.     Can the clinic reply by [facility]? no    Would the patient rather a call back or a response via [facility]? Call back    Best call back number: [phone] or [phone]    Additional Information: n/a

## 2023-09-11 NOTE — TELEPHONE ENCOUNTER
Spoke with pt and attempted to place order for a referral but pt could not provide me with much information as he could not remember what was needed post discharge from hospital visit and said he'll call us back with more information

## 2023-09-14 ENCOUNTER — TELEPHONE (OUTPATIENT)
Dept: INTERNAL MEDICINE | Facility: CLINIC | Age: 33
End: 2023-09-14
Payer: MEDICAID

## 2023-09-14 DIAGNOSIS — E43 SEVERE MALNUTRITION: ICD-10-CM

## 2023-09-14 DIAGNOSIS — J96.01 ACUTE RESPIRATORY FAILURE WITH HYPOXIA: ICD-10-CM

## 2023-09-14 DIAGNOSIS — J45.901 EXACERBATION OF ASTHMA, UNSPECIFIED ASTHMA SEVERITY, UNSPECIFIED WHETHER PERSISTENT: Primary | ICD-10-CM

## 2023-09-14 NOTE — TELEPHONE ENCOUNTER
----- Message from Nathalie Thibodeaux sent at 9/14/2023 11:08 AM CDT -----  Regarding: order  Name of Who is Calling:Mom/Iris           What is the request in detail:pt's mom is calling. Pt was seen in hospital by Dr Mcneill. He was told by Charito to go to [facility] for a pulmonary test. Mom is calling bc she needs the order. Please fax the order to : fax # [phone]    Please call mom Iris [phone] when it is sent so she can call and schedule      Can the clinic reply by [facility]:          What Number to Call Back if not in [facility]:[phone]

## 2023-09-15 NOTE — TELEPHONE ENCOUNTER
----- Message from Nathalie Thibodeaux sent at 9/14/2023 11:08 AM CDT -----  Regarding: order  Name of Who is Calling:Mom/Iris           What is the request in detail:pt's mom is calling. Pt was seen in hospital by Dr Mcneill. He was told by Charito to go to [facility] for a pulmonary test. Mom is calling bc she needs the order. Please fax the order to : fax # [phone]    Please call mom Iris [phone] when it is sent so she can call and schedule      Can the clinic reply by [facility]:          What Number to Call Back if not in [facility]:[phone]

## 2023-09-15 NOTE — TELEPHONE ENCOUNTER
Pended dx codes from pulmonology referral below:    Patient Demographics for MESSI LEON [[id number]]   YOB: 1990 SSN: [id number]   Age: 33 yrs Sex: Male   Home phone: [phone] Work phone:     Address: [address] [facility] [address] E-mail: [email or web address].   Permanent comments:     Referral Information [[id number]]   Patient: MESSI LEON [[id number]] MRN: [id number]   Status: Pending Review Type: Consultation   Class: Internal Reasons: Specialty Services Required [5]   Diagnosis: J45.901 (ICD-10-CM) - Asthma exacerbation  J96.01 (ICD-10-CM) - Acute respiratory failure with hypoxia  E43 (ICD-10-CM) - Severe malnutrition Procedures: REF94 - AMB REFERRAL/CONSULT TO PULMONOLOGY   Start: Sep 08, 2023 Expiration: Sep 07, 2024   Requested: 1 Authorized: 1   Scheduled:   Completed:     Authorization #:   Precertification #:     Referring Location: [facility] Referred to Location:     Referring Department: [facility] ICU Referred To Department:     Referring Provider: REGINE ANDERSON Referred To Provider:

## 2023-09-18 ENCOUNTER — TELEPHONE (OUTPATIENT)
Dept: INTERNAL MEDICINE | Facility: CLINIC | Age: 33
End: 2023-09-18
Payer: MEDICAID

## 2023-09-18 DIAGNOSIS — J45.901 EXACERBATION OF ASTHMA, UNSPECIFIED ASTHMA SEVERITY, UNSPECIFIED WHETHER PERSISTENT: Primary | ICD-10-CM

## 2023-09-18 NOTE — TELEPHONE ENCOUNTER
----- Message from Diane Finn sent at 9/18/2023  9:21 AM CDT -----  Regarding: Patient call back  .Type: Patient Call Back    Who called:Srikanth     What is the request in detail:states Dr. Mcneill was supposed to send orders to the Pulmonology department at [facility]. Hospital states they never received information from the doctor     Can the clinic reply by [facility]?call     Would the patient rather a call back or a response via [facility]? No     Best call back number:[phone]    Additional Information:

## 2023-09-18 NOTE — TELEPHONE ENCOUNTER
Spoke with pt mother Lu who stated the pt was in the hospital on 9/7/23 for asthma. Pt is scheduled to see pulmonary on 9/28/23 and needs a referral placed. Pended referral

## 2023-09-28 ENCOUNTER — OFFICE VISIT (OUTPATIENT)
Dept: PRIMARY CARE CLINIC | Facility: CLINIC | Age: 33
End: 2023-09-28
Payer: MEDICAID

## 2023-09-28 VITALS
HEART RATE: 83 BPM | SYSTOLIC BLOOD PRESSURE: 110 MMHG | HEIGHT: 73 IN | DIASTOLIC BLOOD PRESSURE: 72 MMHG | WEIGHT: 109.25 LBS | BODY MASS INDEX: 14.48 KG/M2

## 2023-09-28 DIAGNOSIS — R63.6 UNDERWEIGHT: ICD-10-CM

## 2023-09-28 DIAGNOSIS — Z13.220 SCREENING CHOLESTEROL LEVEL: ICD-10-CM

## 2023-09-28 DIAGNOSIS — Z09 HOSPITAL DISCHARGE FOLLOW-UP: ICD-10-CM

## 2023-09-28 DIAGNOSIS — J45.51 SEVERE PERSISTENT ASTHMA WITH ACUTE EXACERBATION: Primary | ICD-10-CM

## 2023-09-28 PROCEDURE — 3074F SYST BP LT 130 MM HG: CPT | Mod: CPTII,,, | Performed by: STUDENT IN AN ORGANIZED HEALTH CARE EDUCATION/TRAINING PROGRAM

## 2023-09-28 PROCEDURE — 99999 PR PBB SHADOW E&M-EST. PATIENT-LVL III: ICD-10-PCS | Mod: PBBFAC,,, | Performed by: STUDENT IN AN ORGANIZED HEALTH CARE EDUCATION/TRAINING PROGRAM

## 2023-09-28 PROCEDURE — 3074F PR MOST RECENT SYSTOLIC BLOOD PRESSURE < 130 MM HG: ICD-10-PCS | Mod: CPTII,,, | Performed by: STUDENT IN AN ORGANIZED HEALTH CARE EDUCATION/TRAINING PROGRAM

## 2023-09-28 PROCEDURE — 3078F DIAST BP <80 MM HG: CPT | Mod: CPTII,,, | Performed by: STUDENT IN AN ORGANIZED HEALTH CARE EDUCATION/TRAINING PROGRAM

## 2023-09-28 PROCEDURE — 3008F BODY MASS INDEX DOCD: CPT | Mod: CPTII,,, | Performed by: STUDENT IN AN ORGANIZED HEALTH CARE EDUCATION/TRAINING PROGRAM

## 2023-09-28 PROCEDURE — 3078F PR MOST RECENT DIASTOLIC BLOOD PRESSURE < 80 MM HG: ICD-10-PCS | Mod: CPTII,,, | Performed by: STUDENT IN AN ORGANIZED HEALTH CARE EDUCATION/TRAINING PROGRAM

## 2023-09-28 PROCEDURE — 99214 OFFICE O/P EST MOD 30 MIN: CPT | Mod: S$PBB,,, | Performed by: STUDENT IN AN ORGANIZED HEALTH CARE EDUCATION/TRAINING PROGRAM

## 2023-09-28 PROCEDURE — 99214 PR OFFICE/OUTPT VISIT, EST, LEVL IV, 30-39 MIN: ICD-10-PCS | Mod: S$PBB,,, | Performed by: STUDENT IN AN ORGANIZED HEALTH CARE EDUCATION/TRAINING PROGRAM

## 2023-09-28 PROCEDURE — 99999 PR PBB SHADOW E&M-EST. PATIENT-LVL III: CPT | Mod: PBBFAC,,, | Performed by: STUDENT IN AN ORGANIZED HEALTH CARE EDUCATION/TRAINING PROGRAM

## 2023-09-28 PROCEDURE — 99213 OFFICE O/P EST LOW 20 MIN: CPT | Mod: PBBFAC,PN | Performed by: STUDENT IN AN ORGANIZED HEALTH CARE EDUCATION/TRAINING PROGRAM

## 2023-09-28 PROCEDURE — 1159F PR MEDICATION LIST DOCUMENTED IN MEDICAL RECORD: ICD-10-PCS | Mod: CPTII,,, | Performed by: STUDENT IN AN ORGANIZED HEALTH CARE EDUCATION/TRAINING PROGRAM

## 2023-09-28 PROCEDURE — 1159F MED LIST DOCD IN RCRD: CPT | Mod: CPTII,,, | Performed by: STUDENT IN AN ORGANIZED HEALTH CARE EDUCATION/TRAINING PROGRAM

## 2023-09-28 PROCEDURE — 3008F PR BODY MASS INDEX (BMI) DOCUMENTED: ICD-10-PCS | Mod: CPTII,,, | Performed by: STUDENT IN AN ORGANIZED HEALTH CARE EDUCATION/TRAINING PROGRAM

## 2023-09-28 NOTE — PROGRESS NOTES
09/28/2023    Michael Araujo  [id number]    C: hospital follow up      [facility]    Hospital Course admitted 9/7-9/8  Patient was admitted for acute respiratory failure due to asthma exacerbation.  Patient initially required BiPAP but improved with DuoNeb, steroid and magnesium.  Patient was wean BiPAP to nasal cannula and remained stable overnight.  Patient was further weaned off nasal cannula this morning and breathing have significantly improved to near baseline.  Patient will be set up with a nebulizer machine, pulmonology follow-up and discharged with prednisone and albuterol. Patient deemed medically stable for discharge on 09/08/2023.  Return precautions advised. Patient in agreement with discharge plan.  Patient will need to follow-up with PCP and pulmonology for further care.    Since discharge  Has been doing well. Using nebulizer once daily in the morning and has not required albuterol inh. Is not on maintenance inhaler.    Developed asthma in adulthood a few years agoi  Underweight: eating fish, mash potatoes; eating a lot more than 3x daily  Otherwise patient states that he is doing well and has no concerns today.        Negative 10 point ROS outside of HPI    Social History     Socioeconomic History    Marital status: Single   Tobacco Use    Smoking status: Never    Smokeless tobacco: Never   Substance and Sexual Activity    Alcohol use: Not Currently    Drug use: No    Sexual activity: Yes     Partners: Female     Social Determinants of Health     Financial Resource Strain: Low Risk  (9/8/2023)    Overall Financial Resource Strain (CARDIA)     Difficulty of Paying Living Expenses: Not hard at all   Food Insecurity: No Food Insecurity (9/8/2023)    Hunger Vital Sign     Worried About Running Out of Food in the Last Year: Never true     Ran Out of Food in the Last Year: Never true   Transportation Needs: No Transportation Needs (9/8/2023)    PRAPARE - Transportation     Lack of Transportation (Medical): No      Lack of Transportation (Non-Medical): No   Physical Activity: Inactive (12/22/2021)    Exercise Vital Sign     Days of Exercise per Week: 0 days     Minutes of Exercise per Session: 0 min   Stress: No Stress Concern Present (9/8/2023)    [employer] - Occupational Stress Questionnaire     Feeling of Stress : Only a little   Social Connections: Unknown (9/8/2023)    Social Connection and Isolation Panel [NHANES]     Frequency of Communication with Friends and Family: More than three times a week     Frequency of Social Gatherings with Friends and Family: More than three times a week     Marital Status: Living with partner   Housing Stability: Low Risk  (9/8/2023)    Housing Stability Vital Sign     Unable to Pay for Housing in the Last Year: No     Number of Places Lived in the Last Year: 1     Unstable Housing in the Last Year: No           Current Outpatient Medications:     albuterol (PROVENTIL) 2.5 mg /3 mL (0.083 %) nebulizer solution, Take 3 mLs (2.5 mg total) by nebulization every 6 (six) hours as needed for Wheezing or Shortness of Breath. Rescue, Disp: 75 each, Rfl: 5    albuterol (PROVENTIL/VENTOLIN HFA) 90 mcg/actuation inhaler, Inhale 2 puffs into the lungs every 6 (six) hours as needed for Wheezing. Rescue, Disp: 18 g, Rfl: 2    megestroL (MEGACE) 400 mg/10 mL (40 mg/mL) Susp, Take 5 mLs (200 mg total) by mouth once daily., Disp: 150 mL, Rfl: 2    montelukast (SINGULAIR) 10 mg tablet, Take 1 tablet (10 mg total) by mouth every evening., Disp: 30 tablet, Rfl: 11      Physical Exam  Vitals:    09/28/23 0853   BP: 110/72   Pulse: 83         GEN: NAD, AAox3, well nourished  HEENT: NCAT, EOMI, PEERL, no scleral injection, TM normal, moist mucous membranes, oropharynx clear, no erythema, no exudates  NECK: full rom, no cervical lymphadenopathy, no thyroidmegally  CV: RRR, no m/r/g, trace LE edema  LUNGS: CTAB, non-labored breathing, no wheezes, no crackles  ABD: soft,  non-distended, no rebound/guarding, no organomegaly  EXT: n c/c/e, warm, 5/5 UE and LE strength  NEURO: CNII-XII intact no focal deficit  PSYCH: nl affect, no hallucinations, nl speech  Skin: inatct, no rashes/lesions/erythema      1. Severe persistent asthma with acute exacerbation  Exacerbation resolved. Pt at baseline; continue current regimen  Will get schedule with pulmonology; referral previously placed  - Complete PFT with bronchodilator; Future  - PULSE OXIMETRY WITH REST - PULM; Future    2. Screening cholesterol level  - Lipid Panel; Future    3. Hospital discharge follow-up    4. Underweight  Weight near baseline  Encourage frequent meals and continue megace      RTC in as needed     Alma Olmos MD  Family Medicine

## 2023-09-29 ENCOUNTER — HOSPITAL ENCOUNTER (OUTPATIENT)
Dept: PULMONOLOGY | Facility: CLINIC | Age: 33
Discharge: HOME | End: 2023-09-29
Payer: MEDICAID

## 2023-09-29 DIAGNOSIS — J45.51 SEVERE PERSISTENT ASTHMA WITH ACUTE EXACERBATION: ICD-10-CM

## 2023-09-29 LAB
DLCO ADJ PRE: 25.2 ML/(MIN*MMHG) (ref 28–41.85)
DLCO SINGLE BREATH LLN: 28
DLCO SINGLE BREATH PRE REF: 70 %
DLCO SINGLE BREATH REF: 34.92
DLCOC SBVA LLN: 3.56
DLCOC SBVA PRE REF: 94.7 %
DLCOC SBVA REF: 4.77
DLCOC SINGLE BREATH LLN: 28
DLCOC SINGLE BREATH PRE REF: 72.1 %
DLCOC SINGLE BREATH REF: 34.92
DLCOCSBVAULN: 5.97
DLCOCSINGLEBREATHULN: 41.85
DLCOSINGLEBREATHULN: 41.85
DLCOVA LLN: 3.56
DLCOVA PRE REF: 91.9 %
DLCOVA PRE: 4.38 ML/(MIN*MMHG*L) (ref 3.56–5.97)
DLCOVA REF: 4.77
DLCOVAULN: 5.97
DLVAADJ PRE: 4.51 ML/(MIN*MMHG*L) (ref 3.56–5.97)
ERV LLN: -16448.46
ERV PRE REF: 124.8 %
ERV REF: 1.54
ERVULN: ABNORMAL
FEF 25 75 LLN: 2.08
FEF 25 75 PRE REF: 97 %
FEF 25 75 REF: 3.85
FET100 CHG: -37.8 %
FEV05 LLN: 2.2
FEV05 REF: 3.33
FEV1 CHG: -1.1 %
FEV1 FVC LLN: 72
FEV1 FVC PRE REF: 101.9 %
FEV1 FVC REF: 82
FEV1 LLN: 2.95
FEV1 PRE REF: 96.2 %
FEV1 REF: 3.83
FEV1 VOL CHG: -0.04
FRCPLETH LLN: 2.46
FRCPLETH PREREF: 103.2 %
FRCPLETH REF: 3.44
FRCPLETHULN: 4.43
FVC CHG: -0.3 %
FVC LLN: 3.66
FVC PRE REF: 94.2 %
FVC REF: 4.67
FVC VOL CHG: -0.01
IVC PRE: 4.24 L (ref 3.66–5.69)
IVC SINGLE BREATH LLN: 3.66
IVC SINGLE BREATH PRE REF: 90.8 %
IVC SINGLE BREATH REF: 4.67
IVCSINGLEBREATHULN: 5.69
LLN IC: -9999996.17
PEF LLN: 6.96
PEF PRE REF: 72.9 %
PEF REF: 9.69
PHYSICIAN COMMENT: ABNORMAL
POST FEF 25 75: 3.86 L/S (ref 2.08–6.15)
POST FET 100: 4.12 SEC
POST FEV1 FVC: 83.02 % (ref 71.94–90.72)
POST FEV1: 3.64 L (ref 2.95–4.67)
POST FEV5: 2.78 L (ref 2.2–4.47)
POST FVC: 4.39 L (ref 3.66–5.69)
POST PEF: 6.8 L/S (ref 6.96–12.42)
PRE DLCO: 24.46 ML/(MIN*MMHG) (ref 28–41.85)
PRE ERV: 1.93 L (ref -16448.46–16451.54)
PRE FEF 25 75: 3.73 L/S (ref 2.08–6.15)
PRE FET 100: 6.62 SEC
PRE FEV05 REF: 82.3 %
PRE FEV1 FVC: 83.69 % (ref 71.94–90.72)
PRE FEV1: 3.68 L (ref 2.95–4.67)
PRE FEV5: 2.74 L (ref 2.2–4.47)
PRE FRC PL: 3.56 L (ref 2.46–4.43)
PRE FVC: 4.4 L (ref 3.66–5.69)
PRE IC: 2.48 L (ref -9999996.17–#######.####)
PRE PEF: 7.07 L/S (ref 6.96–12.42)
PRE REF IC: 64.7 %
PRE RV: 1.63 L (ref 1.23–2.58)
PRE TLC: 6.03 L (ref 6.18–8.48)
RAW PRE REF: 81.8 %
RAW PRE: 2.5 CMH2O*S/L (ref 3.06–3.06)
RAW REF: 3.06
REF IC: 3.83
RV LLN: 1.23
RV PRE REF: 85.7 %
RV REF: 1.9
RVTLC LLN: 19
RVTLC PRE REF: 97.9 %
RVTLC PRE: 27.04 % (ref 18.63–36.59)
RVTLC REF: 28
RVTLCULN: 37
RVULN: 2.58
SGAW PRE REF: 113.9 %
SGAW PRE: 0.09 1/(CMH2O*S) (ref 0.08–0.08)
SGAW REF: 0.08
TLC LLN: 6.18
TLC PRE REF: 82.3 %
TLC REF: 7.33
TLC ULN: 8.48
ULN IC: ABNORMAL
VA PRE: 5.58 L (ref 7.18–7.18)
VA SINGLE BREATH LLN: 7.18
VA SINGLE BREATH PRE REF: 77.8 %
VA SINGLE BREATH REF: 7.18
VASINGLEBREATHULN: 7.18
VC LLN: 3.66
VC PRE REF: 94.2 %
VC PRE: 4.4 L (ref 3.66–5.69)
VC REF: 4.67
VC ULN: 5.69

## 2023-09-29 PROCEDURE — 94729 PR C02/MEMBANE DIFFUSE CAPACITY: ICD-10-PCS | Mod: 26,S$PBB,, | Performed by: INTERNAL MEDICINE

## 2023-09-29 PROCEDURE — 94060 EVALUATION OF WHEEZING: CPT | Mod: 26,S$PBB,, | Performed by: INTERNAL MEDICINE

## 2023-09-29 PROCEDURE — 94729 DIFFUSING CAPACITY: CPT | Mod: 26,S$PBB,, | Performed by: INTERNAL MEDICINE

## 2023-09-29 PROCEDURE — 94729 DIFFUSING CAPACITY: CPT | Mod: PBBFAC | Performed by: INTERNAL MEDICINE

## 2023-09-29 PROCEDURE — 94727 GAS DIL/WSHOT DETER LNG VOL: CPT | Mod: 26,S$PBB,, | Performed by: INTERNAL MEDICINE

## 2023-09-29 PROCEDURE — 94060 EVALUATION OF WHEEZING: CPT | Mod: PBBFAC | Performed by: INTERNAL MEDICINE

## 2023-09-29 PROCEDURE — 94060 PR EVAL OF BRONCHOSPASM: ICD-10-PCS | Mod: 26,S$PBB,, | Performed by: INTERNAL MEDICINE

## 2023-09-29 PROCEDURE — 94727 PR PULM FUNCTION TEST BY GAS: ICD-10-PCS | Mod: 26,S$PBB,, | Performed by: INTERNAL MEDICINE

## 2023-09-29 PROCEDURE — 94727 GAS DIL/WSHOT DETER LNG VOL: CPT | Mod: PBBFAC | Performed by: INTERNAL MEDICINE

## 2023-10-09 ENCOUNTER — HOSPITAL ENCOUNTER (OUTPATIENT)
Dept: PULMONOLOGY | Facility: CLINIC | Age: 33
Discharge: HOME | End: 2023-10-09
Payer: MEDICAID

## 2023-10-09 DIAGNOSIS — J45.51 SEVERE PERSISTENT ASTHMA WITH ACUTE EXACERBATION: ICD-10-CM

## 2024-09-24 ENCOUNTER — PATIENT OUTREACH (OUTPATIENT)
Dept: ADMINISTRATIVE | Facility: OTHER | Age: 34
End: 2024-09-24
Payer: MEDICAID

## 2024-09-24 NOTE — PROGRESS NOTES
CHW - Initial Contact    This Community Health Worker reviewed the Social Determinant of Health questionnaire with MRN [id number] over the phone today.    Pt identified barriers of most importance are: No barriers reported   Referrals to community agencies completed with patient/caregiver consent outside of [facility] include: No   Referrals were put through [facility] - No   Support and Services: No support & services have been documented.  Other information discussed the patient needs / wants help with: No assistance provided at this time   Follow up required: No  No future outreach task assigned

## 2024-10-20 PROBLEM — J18.9 PNEUMONIA: Status: ACTIVE | Noted: 2024-10-20

## 2024-10-20 PROBLEM — E87.6 HYPOKALEMIA: Status: ACTIVE | Noted: 2024-10-20

## 2024-10-21 ENCOUNTER — TELEPHONE (OUTPATIENT)
Dept: PULMONOLOGY | Facility: CLINIC | Age: 34
End: 2024-10-21
Payer: MEDICAID

## 2024-10-21 PROBLEM — E87.6 HYPOKALEMIA: Status: RESOLVED | Noted: 2024-10-20 | Resolved: 2024-10-21
